# Patient Record
Sex: FEMALE | Race: WHITE | NOT HISPANIC OR LATINO | URBAN - METROPOLITAN AREA
[De-identification: names, ages, dates, MRNs, and addresses within clinical notes are randomized per-mention and may not be internally consistent; named-entity substitution may affect disease eponyms.]

---

## 2018-07-05 ENCOUNTER — TELEPHONE (OUTPATIENT)
Dept: FAMILY MEDICINE | Facility: CLINIC | Age: 53
End: 2018-07-05

## 2018-07-05 DIAGNOSIS — Z00.00 ROUTINE GENERAL MEDICAL EXAMINATION AT A HEALTH CARE FACILITY: ICD-10-CM

## 2018-07-05 DIAGNOSIS — J30.1 SEASONAL ALLERGIC RHINITIS DUE TO POLLEN, UNSPECIFIED CHRONICITY: ICD-10-CM

## 2018-07-05 DIAGNOSIS — Z11.59 NEED FOR HEPATITIS C SCREENING TEST: Primary | ICD-10-CM

## 2018-07-05 DIAGNOSIS — C43.60: ICD-10-CM

## 2018-07-05 DIAGNOSIS — K90.41 NON-CELIAC GLUTEN SENSITIVITY: ICD-10-CM

## 2018-07-05 NOTE — TELEPHONE ENCOUNTER
Pt would like a lab slip to have her fbw done before her physical on 9/5. She would also like a full thyroid work up. She states there is a lot of family issues with thyroid problems and she has been having a hard time losing weight and keeping weight off even though she is training for a marathon

## 2018-07-10 PROBLEM — K63.5 COLON POLYP: Status: ACTIVE | Noted: 2018-05-07

## 2018-07-10 RX ORDER — ALPRAZOLAM 0.25 MG/1
0.25 TABLET ORAL
COMMUNITY
Start: 2016-03-23

## 2018-07-11 NOTE — TELEPHONE ENCOUNTER
Let pt know labs were ordered    She can go to [facility] and have them done before her physical.  Please print slip and let her know can  Rs  as well to take with her

## 2018-07-24 ENCOUNTER — OFFICE VISIT (OUTPATIENT)
Dept: FAMILY MEDICINE | Facility: CLINIC | Age: 53
End: 2018-07-24
Payer: COMMERCIAL

## 2018-07-24 VITALS
BODY MASS INDEX: 27.53 KG/M2 | WEIGHT: 149.6 LBS | TEMPERATURE: 97.9 F | SYSTOLIC BLOOD PRESSURE: 110 MMHG | DIASTOLIC BLOOD PRESSURE: 80 MMHG | HEART RATE: 64 BPM | HEIGHT: 62 IN | RESPIRATION RATE: 14 BRPM

## 2018-07-24 DIAGNOSIS — B37.0 THRUSH, ORAL: Primary | ICD-10-CM

## 2018-07-24 PROCEDURE — 99213 OFFICE O/P EST LOW 20 MIN: CPT | Performed by: FAMILY MEDICINE

## 2018-07-24 RX ORDER — NYSTATIN 100000 [USP'U]/ML
5 SUSPENSION ORAL 4 TIMES DAILY
Qty: 200 ML | Refills: 0 | Status: SHIPPED | OUTPATIENT
Start: 2018-07-24 | End: 2019-09-17 | Stop reason: ALTCHOICE

## 2018-07-24 ASSESSMENT — ENCOUNTER SYMPTOMS
APPETITE CHANGE: 0
TROUBLE SWALLOWING: 0
SORE THROAT: 0
ACTIVITY CHANGE: 0
FEVER: 0
FATIGUE: 0
CHILLS: 0

## 2018-08-30 ENCOUNTER — TELEPHONE (OUTPATIENT)
Dept: FAMILY MEDICINE | Facility: CLINIC | Age: 53
End: 2018-08-30

## 2018-08-30 LAB
ALBUMIN SERPL-MCNC: 4 G/DL (ref 3.6–5.1)
ALBUMIN/GLOB SERPL: 1.8 (CALC) (ref 1–2.5)
ALP SERPL-CCNC: 57 U/L (ref 33–130)
ALT SERPL-CCNC: 20 U/L (ref 6–29)
APPEARANCE UR: CLEAR
AST SERPL-CCNC: 20 U/L (ref 10–35)
BACTERIA #/AREA URNS HPF: (no result) /HPF
BASOPHILS # BLD AUTO: 18 CELLS/UL (ref 0–200)
BASOPHILS NFR BLD AUTO: 0.3 %
BILIRUB SERPL-MCNC: 0.8 MG/DL (ref 0.2–1.2)
BILIRUB UR QL STRIP: NEGATIVE
BUN SERPL-MCNC: 13 MG/DL (ref 7–25)
BUN/CREAT SERPL: NORMAL (CALC) (ref 6–22)
CALCIUM SERPL-MCNC: 9.2 MG/DL (ref 8.6–10.4)
CHLORIDE SERPL-SCNC: 106 MMOL/L (ref 98–110)
CHOLEST SERPL-MCNC: 208 MG/DL
CHOLEST/HDLC SERPL: 2.4 (CALC)
CO2 SERPL-SCNC: 26 MMOL/L (ref 20–32)
COLOR UR: YELLOW
CREAT SERPL-MCNC: 0.97 MG/DL (ref 0.5–1.05)
EOSINOPHIL # BLD AUTO: 41 CELLS/UL (ref 15–500)
EOSINOPHIL NFR BLD AUTO: 0.7 %
ERYTHROCYTE [DISTWIDTH] IN BLOOD BY AUTOMATED COUNT: 12.3 % (ref 11–15)
GFR SERPL CREATININE-BSD FRML MDRD: 67 ML/MIN/1.73M2
GLOBULIN SER CALC-MCNC: 2.2 G/DL (CALC) (ref 1.9–3.7)
GLUCOSE SERPL-MCNC: 90 MG/DL (ref 65–99)
GLUCOSE UR QL STRIP: NEGATIVE
HCT VFR BLD AUTO: 40.9 % (ref 35–45)
HCV AB S/CO SERPL IA: 0.01
HCV AB SERPL QL IA: NORMAL
HDLC SERPL-MCNC: 85 MG/DL
HGB BLD-MCNC: 13.7 G/DL (ref 11.7–15.5)
HGB UR QL STRIP: NEGATIVE
HYALINE CASTS #/AREA URNS LPF: (no result) /LPF
KETONES UR QL STRIP: NEGATIVE
LDLC SERPL CALC-MCNC: 109 MG/DL (CALC)
LEUKOCYTE ESTERASE UR QL STRIP: (no result)
LYMPHOCYTES # BLD AUTO: 2260 CELLS/UL (ref 850–3900)
LYMPHOCYTES NFR BLD AUTO: 38.3 %
MCH RBC QN AUTO: 30.8 PG (ref 27–33)
MCHC RBC AUTO-ENTMCNC: 33.5 G/DL (ref 32–36)
MCV RBC AUTO: 91.9 FL (ref 80–100)
MONOCYTES # BLD AUTO: 460 CELLS/UL (ref 200–950)
MONOCYTES NFR BLD AUTO: 7.8 %
NEUTROPHILS # BLD AUTO: 3121 CELLS/UL (ref 1500–7800)
NEUTROPHILS NFR BLD AUTO: 52.9 %
NITRITE UR QL STRIP: NEGATIVE
NONHDLC SERPL-MCNC: 123 MG/DL (CALC)
PH UR STRIP: 7.5 [PH] (ref 5–8)
PLATELET # BLD AUTO: 188 THOUSAND/UL (ref 140–400)
PMV BLD REES-ECKER: 10.4 FL (ref 7.5–12.5)
POTASSIUM SERPL-SCNC: 3.9 MMOL/L (ref 3.5–5.3)
PROT SERPL-MCNC: 6.2 G/DL (ref 6.1–8.1)
PROT UR QL STRIP: NEGATIVE
RBC # BLD AUTO: 4.45 MILLION/UL (ref 3.8–5.1)
RBC #/AREA URNS HPF: (no result) /HPF
SODIUM SERPL-SCNC: 140 MMOL/L (ref 135–146)
SP GR UR STRIP: 1 (ref 1–1.03)
SQUAMOUS #/AREA URNS HPF: (no result) /HPF
T4 FREE SERPL-MCNC: 1.1 NG/DL (ref 0.8–1.8)
TRIGL SERPL-MCNC: 47 MG/DL
TSH SERPL-ACNC: 1.51 MIU/L
WBC # BLD AUTO: 5.9 THOUSAND/UL (ref 3.8–10.8)
WBC #/AREA URNS HPF: (no result) /HPF

## 2018-08-30 NOTE — TELEPHONE ENCOUNTER
lmom for patient       ----- Message from Myrna Norwood DO sent at 8/30/2018  9:18 AM EDT -----  Let pt know that the CBC, CMP, TSH, T4 are all ok.   The chol was ok at 208,  The LDL is ok at 109. The urinalysis is ok

## 2018-08-30 NOTE — TELEPHONE ENCOUNTER
Let pt know that the CBC, CMP, TSH, T4 are all ok.   The chol was ok at 208,  The LDL is ok at 109. The urinalysis is ok

## 2018-08-31 ENCOUNTER — TELEPHONE (OUTPATIENT)
Dept: FAMILY MEDICINE | Facility: CLINIC | Age: 53
End: 2018-08-31

## 2018-09-03 PROBLEM — Z00.00 ROUTINE GENERAL MEDICAL EXAMINATION AT A HEALTH CARE FACILITY: Status: ACTIVE | Noted: 2018-09-03

## 2018-09-05 ENCOUNTER — OFFICE VISIT (OUTPATIENT)
Dept: FAMILY MEDICINE | Facility: CLINIC | Age: 53
End: 2018-09-05
Payer: COMMERCIAL

## 2018-09-05 VITALS
HEART RATE: 68 BPM | DIASTOLIC BLOOD PRESSURE: 72 MMHG | TEMPERATURE: 98 F | SYSTOLIC BLOOD PRESSURE: 104 MMHG | WEIGHT: 148.8 LBS | HEIGHT: 62 IN | BODY MASS INDEX: 27.38 KG/M2 | RESPIRATION RATE: 16 BRPM

## 2018-09-05 DIAGNOSIS — J30.1 SEASONAL ALLERGIC RHINITIS DUE TO POLLEN, UNSPECIFIED CHRONICITY: ICD-10-CM

## 2018-09-05 DIAGNOSIS — Z00.00 ROUTINE GENERAL MEDICAL EXAMINATION AT A HEALTH CARE FACILITY: Primary | ICD-10-CM

## 2018-09-05 DIAGNOSIS — K63.5 POLYP OF COLON, UNSPECIFIED PART OF COLON, UNSPECIFIED TYPE: ICD-10-CM

## 2018-09-05 DIAGNOSIS — F41.9 ANXIETY: ICD-10-CM

## 2018-09-05 DIAGNOSIS — Z12.39 BREAST CANCER SCREENING: ICD-10-CM

## 2018-09-05 DIAGNOSIS — Z80.3 FAMILY HISTORY OF BREAST CANCER: ICD-10-CM

## 2018-09-05 DIAGNOSIS — C43.60: ICD-10-CM

## 2018-09-05 DIAGNOSIS — Z87.39 HX OF OSTEOPENIA: ICD-10-CM

## 2018-09-05 DIAGNOSIS — K90.41 NON-CELIAC GLUTEN SENSITIVITY: ICD-10-CM

## 2018-09-05 PROCEDURE — 99396 PREV VISIT EST AGE 40-64: CPT | Performed by: FAMILY MEDICINE

## 2018-09-05 RX ORDER — CHOLECALCIFEROL (VITAMIN D3) 25 MCG
1000 TABLET ORAL DAILY
COMMUNITY

## 2018-09-05 NOTE — ASSESSMENT & PLAN NOTE
routine labs reviewed with pt.   Chol 208 ,   Mammogram Rx given   Tdap up to date  To gyn Gyn for   Has seen Dr Andre in the past

## 2018-09-05 NOTE — PROGRESS NOTES
Subjective      Patient ID: Maureen Melo is a 53 y.o. female.    53 YEAR OLD here for her physical .  Has had a endometrial ablasion about 13 years ago for heavy periods.  This did make them lighter but did not take them fully away.  She stopped periods about 6  Years ago approximate menopause about age 47.  She still has some hot flashes.          Tobacco  Allergies  Meds  Problems  Med Hx  Surg Hx  Fam Hx       Review of Systems   Constitutional: Negative.    HENT: Negative.    Eyes: Negative.    Respiratory: Negative.    Cardiovascular: Negative.    Gastrointestinal: Negative.    Endocrine: Negative.    Genitourinary: Negative.    Musculoskeletal: Negative.    Skin: Negative.    Allergic/Immunologic: Negative.    Neurological: Negative.    Hematological: Negative.      Allergies   Allergen Reactions   • No Known Allergies      Current Outpatient Prescriptions   Medication Sig Dispense Refill   • ALPRAZolam (XANAX) 0.25 mg tablet 0.25 mg.     • cholecalciferol, vitamin D3, 1,000 unit tablet Take 1,000 Units by mouth daily.       No current facility-administered medications for this visit.      Past Medical History:   Diagnosis Date   • Anxiety    • Colon polyp 2018    tubular adenoma   • Family history of breast cancer    • Malignant melanoma of upper arm (CMS/HCC) (HCC) 3/23/2016    Overview:    • Non-celiac gluten sensitivity 2016    Overview:    • Seasonal allergies 3/23/2016    Overview:      Past Surgical History:   Procedure Laterality Date   •  SECTION     • COLONOSCOPY     • DIAGNOSTIC LAPAROSCOPY     • ENDOMETRIAL ABLATION     • SKIN CANCER EXCISION      melanoma   • TONSILLECTOMY     • TUBAL LIGATION       Social History   Substance Use Topics   • Smoking status: Never Smoker   • Smokeless tobacco: Never Used   • Alcohol use Not on file     Family History   Problem Relation Age of Onset   • Anxiety disorder Mother    • Heart disease Mother    • Hyperlipidemia Mother    •  "Thyroid disease Mother    • Prostate cancer Father    • Glaucoma Father    • Breast cancer Sister    • Anxiety disorder Sister    • Thyroid disease Sister        Objective   Vitals:    18 0751   BP: 104/72   Pulse: 68   Resp: 16   Temp: 36.7 °C (98 °F)   Weight: 67.5 kg (148 lb 12.8 oz)   Height: 1.575 m (5' 2\")    Body mass index is 27.22 kg/m².  Physical Exam   Constitutional: She is oriented to person, place, and time. She appears well-developed and well-nourished.   Eyes: Conjunctivae and EOM are normal. Pupils are equal, round, and reactive to light.   Cardiovascular: Normal rate, regular rhythm, normal heart sounds and intact distal pulses.    Pulmonary/Chest: Breath sounds normal. No respiratory distress.   Abdominal: Bowel sounds are normal.   Musculoskeletal: Normal range of motion.   Neurological: She is alert and oriented to person, place, and time.   Skin: Skin is warm.   Psychiatric: She has a normal mood and affect.       Assessment/Plan   Problem List Items Addressed This Visit     Malignant melanoma of upper arm (CMS/HCC) (HCC)     Follows with derm         Non-celiac gluten sensitivity     Follows gluten free diet          Seasonal allergies     Prn meds         Colon polyp     Has had a colon polyp  Last colonoscopy 2018         Routine general medical examination at a health care facility - Primary      routine labs reviewed with pt.   Chol 208 ,   Mammogram Rx given   Tdap up to date  To gyn Gyn for   Has seen Dr Valle in the past           Relevant Orders    DEXA BONE DENSITY    Hx of osteopenia     Continue Vit D 3 1000 Iu  Check Dexa scan. rx given            Relevant Orders    DEXA BONE DENSITY    Anxiety     Prn alprazolam          Family history of breast cancer     Sister  from breast cancer   Mammogram rx given. Make appt with Dr Valle           Other Visit Diagnoses     Breast cancer screening        Relevant Orders    BI SCREENING MAMMOGRAM BILATERAL    "       Patient agrees and verbalizes understanding of medication and treatment plan discussed at today's visit.  Patient was instructed to call or follow-up if symptoms persist or worsen.    No notes on file    Myrna Norwood, DO    This note was created with voice recognition software. Inadvertent dictation errors should be disregarded. Please contact my office for clarification.

## 2018-09-08 PROBLEM — F41.9 ANXIETY: Status: ACTIVE | Noted: 2018-09-08

## 2018-09-08 ASSESSMENT — ENCOUNTER SYMPTOMS
ENDOCRINE NEGATIVE: 1
GASTROINTESTINAL NEGATIVE: 1
ALLERGIC/IMMUNOLOGIC NEGATIVE: 1
CONSTITUTIONAL NEGATIVE: 1
EYES NEGATIVE: 1
CARDIOVASCULAR NEGATIVE: 1
HEMATOLOGIC/LYMPHATIC NEGATIVE: 1
MUSCULOSKELETAL NEGATIVE: 1
RESPIRATORY NEGATIVE: 1
NEUROLOGICAL NEGATIVE: 1

## 2018-09-09 PROBLEM — Z80.3 FAMILY HISTORY OF BREAST CANCER: Status: ACTIVE | Noted: 2018-09-09

## 2018-09-12 ENCOUNTER — TELEPHONE (OUTPATIENT)
Dept: FAMILY MEDICINE | Facility: CLINIC | Age: 53
End: 2018-09-12

## 2018-09-12 NOTE — TELEPHONE ENCOUNTER
Faxed request over  ----- Message from Myrna Norwood DO sent at 9/9/2018 10:06 AM EDT -----  Please get pap and mammogram sent on this pt fro Dr Andre office , listed

## 2019-02-11 ENCOUNTER — TELEPHONE (OUTPATIENT)
Dept: FAMILY MEDICINE | Facility: CLINIC | Age: 54
End: 2019-02-11

## 2019-02-11 NOTE — TELEPHONE ENCOUNTER
Patient called stating she faxed a biometric form last week. She wants to confirm if we received it and if so when will it be completed. Please return her call. [phone].

## 2019-02-13 NOTE — TELEPHONE ENCOUNTER
Pt called about her biometric form, she is faxing another one over just in case you don't have it. She needs it asap

## 2019-04-03 ENCOUNTER — OFFICE VISIT (OUTPATIENT)
Dept: FAMILY MEDICINE | Facility: CLINIC | Age: 54
End: 2019-04-03
Payer: COMMERCIAL

## 2019-04-03 ENCOUNTER — HOSPITAL ENCOUNTER (OUTPATIENT)
Dept: RADIOLOGY | Age: 54
Discharge: HOME | End: 2019-04-03
Attending: FAMILY MEDICINE
Payer: COMMERCIAL

## 2019-04-03 VITALS
BODY MASS INDEX: 28.37 KG/M2 | WEIGHT: 154.2 LBS | TEMPERATURE: 97.9 F | HEIGHT: 62 IN | RESPIRATION RATE: 18 BRPM | HEART RATE: 68 BPM | SYSTOLIC BLOOD PRESSURE: 112 MMHG | DIASTOLIC BLOOD PRESSURE: 80 MMHG

## 2019-04-03 DIAGNOSIS — G89.29 CHRONIC RIGHT-SIDED LOW BACK PAIN WITH RIGHT-SIDED SCIATICA: Primary | ICD-10-CM

## 2019-04-03 DIAGNOSIS — G89.29 CHRONIC RIGHT-SIDED LOW BACK PAIN WITH RIGHT-SIDED SCIATICA: ICD-10-CM

## 2019-04-03 DIAGNOSIS — R82.998 LEUKOCYTES IN URINE: ICD-10-CM

## 2019-04-03 DIAGNOSIS — M54.41 CHRONIC RIGHT-SIDED LOW BACK PAIN WITH RIGHT-SIDED SCIATICA: Primary | ICD-10-CM

## 2019-04-03 DIAGNOSIS — M54.41 CHRONIC RIGHT-SIDED LOW BACK PAIN WITH RIGHT-SIDED SCIATICA: ICD-10-CM

## 2019-04-03 LAB
BILIRUBIN, POC: NEGATIVE
BLOOD URINE, POC: NEGATIVE
CLARITY, POC: CLEAR
COLOR, POC: YELLOW
EXPIRATION DATE: ABNORMAL
GLUCOSE URINE, POC: NEGATIVE
KETONES, POC: NEGATIVE
LEUKOCYTE EST, POC: ABNORMAL
Lab: ABNORMAL
NITRITE, POC: NEGATIVE
PH, POC: 5
PROTEIN, POC: NEGATIVE
SPECIFIC GRAVITY, POC: 1
UROBILINOGEN, POC: >=8

## 2019-04-03 PROCEDURE — 99213 OFFICE O/P EST LOW 20 MIN: CPT | Mod: 25 | Performed by: FAMILY MEDICINE

## 2019-04-03 PROCEDURE — 81002 URINALYSIS NONAUTO W/O SCOPE: CPT | Performed by: FAMILY MEDICINE

## 2019-04-03 PROCEDURE — 72110 X-RAY EXAM L-2 SPINE 4/>VWS: CPT

## 2019-04-03 RX ORDER — MELOXICAM 7.5 MG/1
7.5 TABLET ORAL DAILY
Qty: 30 TABLET | Refills: 0 | Status: SHIPPED | OUTPATIENT
Start: 2019-04-03 | End: 2019-04-30 | Stop reason: SDUPTHER

## 2019-04-03 NOTE — PROGRESS NOTES
Subjective      Patient ID: Maureen Melo is a 53 y.o. female.    Pt c/o back pain and pain down her leg.  She was in FLA/     This pain came on about 3 weeks ago        Tobacco  Med Hx  Surg Hx  Fam Hx       Review of Systems   Constitutional: Negative.    Respiratory: Negative.    Cardiovascular: Negative.    Musculoskeletal: Positive for back pain.   Skin: Negative.    Neurological: Negative.      Allergies   Allergen Reactions   • No Known Allergies      Current Outpatient Prescriptions   Medication Sig Dispense Refill   • cholecalciferol, vitamin D3, 1,000 unit tablet Take 1,000 Units by mouth daily.     • ALPRAZolam (XANAX) 0.25 mg tablet 0.25 mg.     • meloxicam (MOBIC) 7.5 mg tablet Take 1 tablet (7.5 mg total) by mouth daily. 30 tablet 0     No current facility-administered medications for this visit.      Past Medical History:   Diagnosis Date   • Anxiety    • Colon polyp 2018    tubular adenoma   • Family history of breast cancer    • Malignant melanoma of upper arm (CMS/HCC) (HCC) 3/23/2016    Overview:    • Non-celiac gluten sensitivity 2016    Overview:    • Osteopenia   study   • Seasonal allergies 3/23/2016    Overview:      Past Surgical History:   Procedure Laterality Date   •  SECTION     • COLONOSCOPY     • DIAGNOSTIC LAPAROSCOPY     • ENDOMETRIAL ABLATION     • SKIN CANCER EXCISION      melanoma   • TONSILLECTOMY     • TUBAL LIGATION       Social History   Substance Use Topics   • Smoking status: Never Smoker   • Smokeless tobacco: Never Used   • Alcohol use Not on file     Family History   Problem Relation Age of Onset   • Anxiety disorder Mother    • Heart disease Mother    • Hyperlipidemia Mother    • Thyroid disease Mother    • Prostate cancer Father    • Glaucoma Father    • Breast cancer Sister    • Anxiety disorder Sister    • Thyroid disease Sister        Objective   Vitals:    19 0911   BP: 112/80   Pulse: 68   Resp: 18   Temp: 36.6 °C (97.9 °F)  "  Weight: 69.9 kg (154 lb 3.2 oz)   Height: 1.575 m (5' 2\")    Body mass index is 28.2 kg/m².  Physical Exam   Constitutional: She appears well-developed and well-nourished.   Neck: Normal range of motion. Neck supple.   Cardiovascular: Normal rate.    Pulmonary/Chest: Effort normal.   Musculoskeletal: She exhibits tenderness.   Low back SI joint area       Assessment/Plan   Problem List Items Addressed This Visit     Chronic right-sided low back pain with right-sided sciatica - Primary     Referral to PT  Also NSAID, heat and ice   Xray            Relevant Medications    meloxicam (MOBIC) 7.5 mg tablet    Other Relevant Orders    X-RAY LUMBAR SPINE COMPLETE 4+ VIEWS (Completed)    POCT urinalysis dipstick (Completed)    Ambulatory referral to Physical Therapy    Leukocytes in urine     Urine cx done         Relevant Orders    Urine culture (Completed)          Patient agrees and verbalizes understanding of medication and treatment plan discussed at today's visit.  Patient was instructed to call or follow-up if symptoms persist or worsen.    No notes on file    Myrna Norwood,       This note was created with voice recognition software. Inadvertent dictation errors should be disregarded. Please contact my office for clarification.  "

## 2019-04-04 ENCOUNTER — TELEPHONE (OUTPATIENT)
Dept: FAMILY MEDICINE | Facility: CLINIC | Age: 54
End: 2019-04-04

## 2019-04-04 PROBLEM — M54.41 CHRONIC RIGHT-SIDED LOW BACK PAIN WITH RIGHT-SIDED SCIATICA: Status: ACTIVE | Noted: 2019-04-04

## 2019-04-04 PROBLEM — G89.29 CHRONIC RIGHT-SIDED LOW BACK PAIN WITH RIGHT-SIDED SCIATICA: Status: ACTIVE | Noted: 2019-04-04

## 2019-04-04 NOTE — TELEPHONE ENCOUNTER
Patient and wanted you to know that they are going to make appointment with [facility] in [place]. Please call with questions [phone]

## 2019-04-04 NOTE — TELEPHONE ENCOUNTER
Pt called again. Could you possibly do PT script today so she can pick it up. Give when it's ready.

## 2019-04-05 LAB — BACTERIA UR CULT: NORMAL

## 2019-04-07 PROBLEM — R82.998 LEUKOCYTES IN URINE: Status: ACTIVE | Noted: 2019-04-07

## 2019-04-07 ASSESSMENT — ENCOUNTER SYMPTOMS
CONSTITUTIONAL NEGATIVE: 1
NEUROLOGICAL NEGATIVE: 1
BACK PAIN: 1
RESPIRATORY NEGATIVE: 1
CARDIOVASCULAR NEGATIVE: 1

## 2019-04-30 DIAGNOSIS — G89.29 CHRONIC RIGHT-SIDED LOW BACK PAIN WITH RIGHT-SIDED SCIATICA: ICD-10-CM

## 2019-04-30 DIAGNOSIS — M54.41 CHRONIC RIGHT-SIDED LOW BACK PAIN WITH RIGHT-SIDED SCIATICA: ICD-10-CM

## 2019-04-30 RX ORDER — MELOXICAM 7.5 MG/1
TABLET ORAL
Qty: 30 TABLET | Refills: 0 | Status: SHIPPED | OUTPATIENT
Start: 2019-04-30 | End: 2019-09-17 | Stop reason: ALTCHOICE

## 2019-08-06 ENCOUNTER — TELEPHONE (OUTPATIENT)
Dept: FAMILY MEDICINE | Facility: CLINIC | Age: 54
End: 2019-08-06

## 2019-08-06 DIAGNOSIS — Z80.3 FAMILY HISTORY OF BREAST CANCER: ICD-10-CM

## 2019-08-06 DIAGNOSIS — Z00.00 ROUTINE GENERAL MEDICAL EXAMINATION AT A HEALTH CARE FACILITY: ICD-10-CM

## 2019-08-06 DIAGNOSIS — Z87.39 HX OF OSTEOPENIA: Primary | ICD-10-CM

## 2019-08-06 DIAGNOSIS — K90.41 NON-CELIAC GLUTEN SENSITIVITY: ICD-10-CM

## 2019-08-06 NOTE — TELEPHONE ENCOUNTER
Patient has a physical in September and would like a fbw ([facility]). Call her when it is ready

## 2019-09-04 LAB
25(OH)D3 SERPL-MCNC: 58 NG/ML (ref 30–100)
ALBUMIN SERPL-MCNC: 4.1 G/DL (ref 3.6–5.1)
ALBUMIN/GLOB SERPL: 2 (CALC) (ref 1–2.5)
ALP SERPL-CCNC: 51 U/L (ref 33–130)
ALT SERPL-CCNC: 20 U/L (ref 6–29)
APPEARANCE UR: CLEAR
AST SERPL-CCNC: 18 U/L (ref 10–35)
BACTERIA #/AREA URNS HPF: (no result) /HPF
BASOPHILS # BLD AUTO: 28 CELLS/UL (ref 0–200)
BASOPHILS NFR BLD AUTO: 0.7 %
BILIRUB SERPL-MCNC: 0.9 MG/DL (ref 0.2–1.2)
BILIRUB UR QL STRIP: NEGATIVE
BUN SERPL-MCNC: 20 MG/DL (ref 7–25)
BUN/CREAT SERPL: NORMAL (CALC) (ref 6–22)
CALCIUM SERPL-MCNC: 9.2 MG/DL (ref 8.6–10.4)
CHLORIDE SERPL-SCNC: 105 MMOL/L (ref 98–110)
CHOLEST SERPL-MCNC: 194 MG/DL
CHOLEST/HDLC SERPL: 2.9 (CALC)
CO2 SERPL-SCNC: 26 MMOL/L (ref 20–32)
COLOR UR: YELLOW
CREAT SERPL-MCNC: 1.05 MG/DL (ref 0.5–1.05)
EOSINOPHIL # BLD AUTO: 68 CELLS/UL (ref 15–500)
EOSINOPHIL NFR BLD AUTO: 1.7 %
ERYTHROCYTE [DISTWIDTH] IN BLOOD BY AUTOMATED COUNT: 12.5 % (ref 11–15)
GLOBULIN SER CALC-MCNC: 2.1 G/DL (CALC) (ref 1.9–3.7)
GLUCOSE SERPL-MCNC: 92 MG/DL (ref 65–99)
GLUCOSE UR QL STRIP: NEGATIVE
HCT VFR BLD AUTO: 44.9 % (ref 35–45)
HDLC SERPL-MCNC: 66 MG/DL
HGB BLD-MCNC: 14.5 G/DL (ref 11.7–15.5)
HGB UR QL STRIP: NEGATIVE
HYALINE CASTS #/AREA URNS LPF: (no result) /LPF
KETONES UR QL STRIP: NEGATIVE
LDLC SERPL CALC-MCNC: 113 MG/DL (CALC)
LEUKOCYTE ESTERASE UR QL STRIP: (no result)
LYMPHOCYTES # BLD AUTO: 2140 CELLS/UL (ref 850–3900)
LYMPHOCYTES NFR BLD AUTO: 53.5 %
MCH RBC QN AUTO: 29.5 PG (ref 27–33)
MCHC RBC AUTO-ENTMCNC: 32.3 G/DL (ref 32–36)
MCV RBC AUTO: 91.3 FL (ref 80–100)
MONOCYTES # BLD AUTO: 328 CELLS/UL (ref 200–950)
MONOCYTES NFR BLD AUTO: 8.2 %
NEUTROPHILS # BLD AUTO: 1436 CELLS/UL (ref 1500–7800)
NEUTROPHILS NFR BLD AUTO: 35.9 %
NITRITE UR QL STRIP: NEGATIVE
NONHDLC SERPL-MCNC: 128 MG/DL (CALC)
PH UR STRIP: 6.5 [PH] (ref 5–8)
PLATELET # BLD AUTO: 189 THOUSAND/UL (ref 140–400)
PMV BLD REES-ECKER: 10.9 FL (ref 7.5–12.5)
POTASSIUM SERPL-SCNC: 3.9 MMOL/L (ref 3.5–5.3)
PROT SERPL-MCNC: 6.2 G/DL (ref 6.1–8.1)
PROT UR QL STRIP: NEGATIVE
QUEST EGFR NON-AFR. AMERICAN: 60 ML/MIN/1.73M2
RBC # BLD AUTO: 4.92 MILLION/UL (ref 3.8–5.1)
RBC #/AREA URNS HPF: (no result) /HPF
SODIUM SERPL-SCNC: 139 MMOL/L (ref 135–146)
SP GR UR STRIP: 1 (ref 1–1.03)
SQUAMOUS #/AREA URNS HPF: (no result) /HPF
TRIGL SERPL-MCNC: 64 MG/DL
TSH SERPL-ACNC: 1.5 MIU/L
WBC # BLD AUTO: 4 THOUSAND/UL (ref 3.8–10.8)
WBC #/AREA URNS HPF: (no result) /HPF

## 2019-09-17 ENCOUNTER — OFFICE VISIT (OUTPATIENT)
Dept: FAMILY MEDICINE | Facility: CLINIC | Age: 54
End: 2019-09-17
Payer: COMMERCIAL

## 2019-09-17 VITALS
BODY MASS INDEX: 26.68 KG/M2 | TEMPERATURE: 98.2 F | DIASTOLIC BLOOD PRESSURE: 78 MMHG | HEIGHT: 62 IN | RESPIRATION RATE: 13 BRPM | WEIGHT: 145 LBS | SYSTOLIC BLOOD PRESSURE: 114 MMHG | HEART RATE: 68 BPM

## 2019-09-17 DIAGNOSIS — C43.60: ICD-10-CM

## 2019-09-17 DIAGNOSIS — J30.2 SEASONAL ALLERGIES: ICD-10-CM

## 2019-09-17 DIAGNOSIS — F41.9 ANXIETY: ICD-10-CM

## 2019-09-17 DIAGNOSIS — K63.5 POLYP OF COLON, UNSPECIFIED PART OF COLON, UNSPECIFIED TYPE: ICD-10-CM

## 2019-09-17 DIAGNOSIS — Z87.39 HX OF OSTEOPENIA: ICD-10-CM

## 2019-09-17 DIAGNOSIS — K90.41 NON-CELIAC GLUTEN SENSITIVITY: ICD-10-CM

## 2019-09-17 DIAGNOSIS — Z00.00 ROUTINE GENERAL MEDICAL EXAMINATION AT A HEALTH CARE FACILITY: Primary | ICD-10-CM

## 2019-09-17 DIAGNOSIS — Z12.31 SCREENING MAMMOGRAM, ENCOUNTER FOR: ICD-10-CM

## 2019-09-17 DIAGNOSIS — Z80.3 FAMILY HISTORY OF BREAST CANCER: ICD-10-CM

## 2019-09-17 PROCEDURE — 99396 PREV VISIT EST AGE 40-64: CPT | Performed by: FAMILY MEDICINE

## 2019-09-17 RX ORDER — HYDROCHLOROTHIAZIDE 25 MG/1
TABLET ORAL
COMMUNITY
Start: 1999-11-09 | End: 2019-09-17 | Stop reason: ALTCHOICE

## 2019-09-17 ASSESSMENT — ENCOUNTER SYMPTOMS
HEMATOLOGIC/LYMPHATIC NEGATIVE: 1
CARDIOVASCULAR NEGATIVE: 1
NUMBNESS: 1
RESPIRATORY NEGATIVE: 1
ENDOCRINE NEGATIVE: 1
ALLERGIC/IMMUNOLOGIC NEGATIVE: 1
EYES NEGATIVE: 1
CONSTITUTIONAL NEGATIVE: 1
MUSCULOSKELETAL NEGATIVE: 1
PSYCHIATRIC NEGATIVE: 1
GASTROINTESTINAL NEGATIVE: 1

## 2019-09-17 NOTE — ASSESSMENT & PLAN NOTE
Prescription given for mammogram today.  Make appointment with Dr. Andre  Breast cancer in sister (  of breast cancer)

## 2019-09-17 NOTE — ASSESSMENT & PLAN NOTE
Labs reviewed and good  Mammogram prescription copy will go to GYN Dr. Andre  Colonoscopy up-to-date 2018 due in 5 years  See GYN for yearly pelvic/Pap  Tdap up-to-date   Does not want a flu shot today    Does not want to have dexa presently

## 2019-09-17 NOTE — ASSESSMENT & PLAN NOTE
Continue vitamin D  DEXA scan 2014 scanned with osteopenia  Pt has been menopausal since about age 46  Does not want dexa scan but will consider it

## 2019-09-17 NOTE — PROGRESS NOTES
Subjective      Patient ID: Maureen Melo is a 54 y.o. female.    Patient is here for her physical. She denies specific complaints except for left hand tingling in certain activities like driving a car or riding her bike.  She denies other issues at present time, exercises regularly without difficulty.        Tobacco  Allergies  Meds  Problems  Med Hx  Surg Hx  Fam Hx       Review of Systems   Constitutional: Negative.    HENT: Negative.    Eyes: Negative.    Respiratory: Negative.    Cardiovascular: Negative.    Gastrointestinal: Negative.    Endocrine: Negative.    Genitourinary: Negative.    Musculoskeletal: Negative.    Allergic/Immunologic: Negative.    Neurological: Positive for numbness (left hand - as per HPI).   Hematological: Negative.    Psychiatric/Behavioral: Negative.      Allergies   Allergen Reactions   • No Known Allergies      Current Outpatient Prescriptions   Medication Sig Dispense Refill   • ALPRAZolam (XANAX) 0.25 mg tablet 0.25 mg.     • cholecalciferol, vitamin D3, 1,000 unit tablet Take 1,000 Units by mouth daily.       No current facility-administered medications for this visit.      Past Medical History:   Diagnosis Date   • Anxiety    • Colon polyp 2018    tubular adenoma   • Family history of breast cancer    • Malignant melanoma of upper arm (CMS/HCC) (HCC) 3/23/2016    Overview:    • Non-celiac gluten sensitivity 2016    Overview:    • Osteopenia   study   • Seasonal allergies 3/23/2016    Overview:      Past Surgical History:   Procedure Laterality Date   •  SECTION     • COLONOSCOPY     • DIAGNOSTIC LAPAROSCOPY     • ENDOMETRIAL ABLATION     • SKIN CANCER EXCISION      melanoma   • TONSILLECTOMY     • TUBAL LIGATION       Social History   Substance Use Topics   • Smoking status: Never Smoker   • Smokeless tobacco: Never Used   • Alcohol use Not on file     Family History   Problem Relation Age of Onset   • Anxiety disorder Biological Mother    • Heart  "disease Biological Mother    • Hyperlipidemia Biological Mother    • Thyroid disease Biological Mother    • Prostate cancer Biological Father    • Glaucoma Biological Father    • Breast cancer Sister    • Anxiety disorder Sister    • Thyroid disease Sister        Objective   Vitals:    09/17/19 0747   BP: 114/78   Pulse: 68   Resp: 13   Temp: 36.8 °C (98.2 °F)   Weight: 65.8 kg (145 lb)   Height: 1.575 m (5' 2\")    Body mass index is 26.52 kg/m².  Physical Exam   Constitutional: She is oriented to person, place, and time. She appears well-developed and well-nourished.   HENT:   Head: Normocephalic and atraumatic.   Mouth/Throat: Oropharynx is clear and moist.   Eyes: Pupils are equal, round, and reactive to light. EOM are normal.   Neck: Normal range of motion. Neck supple. No thyromegaly present.   Cardiovascular: Normal rate, regular rhythm and normal heart sounds.    No murmur heard.  Pulmonary/Chest: Effort normal and breath sounds normal.   Abdominal: Soft. Bowel sounds are normal. She exhibits no mass. There is no tenderness. There is no guarding.   Musculoskeletal: Normal range of motion.   Lymphadenopathy:     She has no cervical adenopathy.   Neurological: She is alert and oriented to person, place, and time.   + Tinel and + Phalen left   Skin: Skin is warm and dry.   Sun damage .   Psychiatric: She has a normal mood and affect.   Vitals reviewed.      Assessment/Plan   Problem List Items Addressed This Visit     Malignant melanoma of upper arm (CMS/HCC) (HCC)     Follows with dermatology         Non-celiac gluten sensitivity     Patient follows gluten-free diet.  stable         Seasonal allergies     PRN meds         Colon polyp     Last colonoscopy was in 2018.  Due in 5 years         Routine general medical examination at a health care facility - Primary     Labs reviewed and good  Mammogram prescription copy will go to GYN Dr. Andre  Colonoscopy up-to-date 2018 due in 5 years  See GYN for yearly " pelvic/Pap  Tdap up-to-date   Does not want a flu shot today    Does not want to have dexa presently          Hx of osteopenia     Continue vitamin D  DEXA scan  scanned with osteopenia  Pt has been menopausal since about age 46  Does not want dexa scan but will consider it          Anxiety     PRN alprazolam.  Does not use very often.  Exercise helps this as well and she is needing less         Family history of breast cancer     Prescription given for mammogram today.  Make appointment with Dr. Andre  Breast cancer in sister (  of breast cancer)          Screening mammogram, encounter for     Prescription given the patient         Relevant Orders    BI SCREENING MAMMOGRAM BILATERAL          Patient agrees and verbalizes understanding of medication and treatment plan discussed at today's visit.  Patient was instructed to call or follow-up if symptoms persist or worsen.    No notes on file    Myrna Norwood,       This note was created with voice recognition software. Inadvertent dictation errors should be disregarded. Please contact my office for clarification.

## 2020-01-02 ENCOUNTER — TELEPHONE (OUTPATIENT)
Dept: FAMILY MEDICINE | Facility: CLINIC | Age: 55
End: 2020-01-02

## 2020-01-02 RX ORDER — CYCLOBENZAPRINE HCL 10 MG
10 TABLET ORAL EVERY 8 HOURS PRN
Qty: 20 TABLET | Refills: 0 | Status: SHIPPED | OUTPATIENT
Start: 2020-01-02 | End: 2020-01-16

## 2020-01-02 NOTE — TELEPHONE ENCOUNTER
Pt has been having back spasms. Pt is scheduled for a massage and for a chiropractic appointment, wants to know if she can have a script for a muscle relaxer in the meantime

## 2020-01-06 ENCOUNTER — OFFICE VISIT (OUTPATIENT)
Dept: FAMILY MEDICINE | Facility: CLINIC | Age: 55
End: 2020-01-06
Payer: COMMERCIAL

## 2020-01-06 VITALS
WEIGHT: 144 LBS | SYSTOLIC BLOOD PRESSURE: 104 MMHG | DIASTOLIC BLOOD PRESSURE: 70 MMHG | BODY MASS INDEX: 26.5 KG/M2 | RESPIRATION RATE: 16 BRPM | HEART RATE: 80 BPM | HEIGHT: 62 IN

## 2020-01-06 DIAGNOSIS — G89.29 CHRONIC RIGHT-SIDED LOW BACK PAIN WITH RIGHT-SIDED SCIATICA: Primary | ICD-10-CM

## 2020-01-06 DIAGNOSIS — M54.41 CHRONIC RIGHT-SIDED LOW BACK PAIN WITH RIGHT-SIDED SCIATICA: Primary | ICD-10-CM

## 2020-01-06 DIAGNOSIS — C43.60: ICD-10-CM

## 2020-01-06 DIAGNOSIS — M25.551 RIGHT HIP PAIN: ICD-10-CM

## 2020-01-06 PROCEDURE — 99213 OFFICE O/P EST LOW 20 MIN: CPT | Performed by: FAMILY MEDICINE

## 2020-01-06 ASSESSMENT — ENCOUNTER SYMPTOMS
DYSURIA: 0
BRUISES/BLEEDS EASILY: 1
BACK PAIN: 1
CARDIOVASCULAR NEGATIVE: 1
NUMBNESS: 0
ADENOPATHY: 1
CONSTITUTIONAL NEGATIVE: 1
FREQUENCY: 0
WEAKNESS: 0
RESPIRATORY NEGATIVE: 1

## 2020-01-06 NOTE — ASSESSMENT & PLAN NOTE
X-ray in April degenerative disease with some mild spondylolysis    Patient would like MRI.  Patient currently with some right-sided SI joint pain some radiation to the hip.  Mild joint pain in the hip with external rotation    Patient saw the chiropractor.  They suggested she may have some problem with L2-L3.  Do not really see any straight leg raise positivity.  She does have pain that radiates to the front    MRI Ordered and suggested she start meloxicam 7.5 mg daily along with cyclobenzaprine at bedtime ice and heat and will try to get pre-CERT on the MRI

## 2020-01-06 NOTE — PROGRESS NOTES
Subjective      Patient ID: Maureen Melo is a 54 y.o. female.    Pt was sick 2 weeks ago.  She was in bed for 3 days.  Head cold and fever,  Left with back pain from laying around per pt.  Now can't lay or stand or walk etc . Pain is in the right back and thru to the hip and down the front of the leg.  Saw patient in April for right-sided back pain as well with some radiation down the back of the leg.  She had PT at that point.  She had an x-ray which showed some degenerative arthritis with mild spondylolysis.  She did get better in between and then the above history was provided      Tobacco  Allergies  Meds  Problems  Med Hx  Surg Hx  Fam Hx       Review of Systems   Constitutional: Negative.    HENT: Negative.    Respiratory: Negative.    Cardiovascular: Negative.    Genitourinary: Negative for dysuria, frequency and urgency.   Musculoskeletal: Positive for back pain.        Negative straight leg raise bilaterally.  Tender right SI joint.  Hip joint mostly normal exam.  Mild tenderness with external rotation   Neurological: Negative for weakness and numbness.        Maybe feels little weaker on that side    Hematological: Positive for adenopathy. Bruises/bleeds easily.     Allergies   Allergen Reactions   • No Known Allergies      Current Outpatient Medications   Medication Sig Dispense Refill   • cholecalciferol, vitamin D3, 1,000 unit tablet Take 1,000 Units by mouth daily.     • cyclobenzaprine (FLEXERIL) 10 mg tablet Take 1 tablet (10 mg total) by mouth every 8 (eight) hours as needed for muscle spasms for up to 14 days. 20 tablet 0   • ALPRAZolam (XANAX) 0.25 mg tablet 0.25 mg.       No current facility-administered medications for this visit.      Past Medical History:   Diagnosis Date   • Anxiety    • Colon polyp 05/07/2018    tubular adenoma   • Family history of breast cancer    • Malignant melanoma of upper arm (CMS/HCC) 3/23/2016    Overview:    • Non-celiac gluten sensitivity 8/4/2016     "Overview:    • Osteopenia 2014  study   • Seasonal allergies 3/23/2016    Overview:      Past Surgical History:   Procedure Laterality Date   •  SECTION     • COLONOSCOPY     • DIAGNOSTIC LAPAROSCOPY     • ENDOMETRIAL ABLATION     • SKIN CANCER EXCISION      melanoma   • TONSILLECTOMY     • TUBAL LIGATION       Social History     Tobacco Use   • Smoking status: Never Smoker   • Smokeless tobacco: Never Used   Substance Use Topics   • Alcohol use: Not on file   • Drug use: Not on file     Family History   Problem Relation Age of Onset   • Anxiety disorder Biological Mother    • Heart disease Biological Mother    • Hyperlipidemia Biological Mother    • Thyroid disease Biological Mother    • Prostate cancer Biological Father    • Glaucoma Biological Father    • Breast cancer Biological Sister    • Anxiety disorder Biological Sister    • Thyroid disease Biological Sister        Objective   Vitals:    20 1137   BP: 104/70   Pulse: 80   Resp: 16   Weight: 65.3 kg (144 lb)   Height: 1.575 m (5' 2\")    Body mass index is 26.34 kg/m².  Physical Exam   Constitutional: She is oriented to person, place, and time. She appears well-developed and well-nourished.   HENT:   Mouth/Throat: Oropharynx is clear and moist.   Neck: Normal range of motion. Neck supple.   Cardiovascular: Normal rate and regular rhythm.   Pulmonary/Chest: Effort normal and breath sounds normal.   Abdominal: Soft. She exhibits no distension.   Musculoskeletal: Normal range of motion. She exhibits tenderness (right SI joint ).   Neurological: She is alert and oriented to person, place, and time.   Skin: Skin is warm and dry.   Vitals reviewed.      Assessment/Plan   Problem List Items Addressed This Visit        Nervous    Chronic right-sided low back pain with right-sided sciatica - Primary     X-ray in April degenerative disease with some mild spondylolysis    Patient would like MRI.  Patient currently with some right-sided SI joint pain some " radiation to the hip.  Mild joint pain in the hip with external rotation    Patient saw the chiropractor.  They suggested she may have some problem with L2-L3.  Do not really see any straight leg raise positivity.  She does have pain that radiates to the front    MRI Ordered and suggested she start meloxicam 7.5 mg daily along with cyclobenzaprine at bedtime ice and heat and will try to get pre-CERT on the MRI         Relevant Orders    X-RAY HIP WITH OR WITHOUT PELVIS 4+ VW RIGHT    X-RAY LUMBAR SPINE COMPLETE 4+ VIEWS    MRI LUMBAR SPINE WITHOUT CONTRAST    Right hip pain    Relevant Orders    X-RAY HIP WITH OR WITHOUT PELVIS 4+ VW RIGHT    X-RAY LUMBAR SPINE COMPLETE 4+ VIEWS    MRI LUMBAR SPINE WITHOUT CONTRAST       Musculoskeletal    Malignant melanoma of upper arm (CMS/HCC)          Patient agrees and verbalizes understanding of medication and treatment plan discussed at today's visit.  Patient was instructed to call or follow-up if symptoms persist or worsen.    No notes on file    Myrna Norwood, DO      This note was created with voice recognition software. Inadvertent dictation errors should be disregarded. Please contact my office for clarification.

## 2020-01-09 ENCOUNTER — TELEPHONE (OUTPATIENT)
Dept: FAMILY MEDICINE | Facility: CLINIC | Age: 55
End: 2020-01-09

## 2020-01-09 NOTE — TELEPHONE ENCOUNTER
Reviewed MRI of back with patient.  She is going to see spine specialist that her mom sees.  She will call me with the name. MRI put in for scanning

## 2020-01-29 ENCOUNTER — TELEPHONE (OUTPATIENT)
Dept: FAMILY MEDICINE | Facility: CLINIC | Age: 55
End: 2020-01-29

## 2020-01-29 NOTE — TELEPHONE ENCOUNTER
----- Message from Myrna Norwood, DO sent at 1/28/2020  8:29 PM EST -----  Let pt know that the mammogram was ok. Repeat in 1 year

## 2020-10-01 ENCOUNTER — TELEPHONE (OUTPATIENT)
Dept: FAMILY MEDICINE | Facility: CLINIC | Age: 55
End: 2020-10-01

## 2021-04-01 ENCOUNTER — APPOINTMENT (OUTPATIENT)
Dept: URBAN - METROPOLITAN AREA CLINIC 200 | Age: 56
Setting detail: DERMATOLOGY
End: 2021-04-05

## 2021-04-01 DIAGNOSIS — L57.8 OTHER SKIN CHANGES DUE TO CHRONIC EXPOSURE TO NONIONIZING RADIATION: ICD-10-CM

## 2021-04-01 DIAGNOSIS — D22 MELANOCYTIC NEVI: ICD-10-CM

## 2021-04-01 DIAGNOSIS — Z85.828 PERSONAL HISTORY OF OTHER MALIGNANT NEOPLASM OF SKIN: ICD-10-CM

## 2021-04-01 DIAGNOSIS — L73.8 OTHER SPECIFIED FOLLICULAR DISORDERS: ICD-10-CM

## 2021-04-01 DIAGNOSIS — Z85.820 PERSONAL HISTORY OF MALIGNANT MELANOMA OF SKIN: ICD-10-CM

## 2021-04-01 PROBLEM — D22.5 MELANOCYTIC NEVI OF TRUNK: Status: ACTIVE | Noted: 2021-04-01

## 2021-04-01 PROCEDURE — OTHER REASSURANCE: OTHER

## 2021-04-01 PROCEDURE — 99202 OFFICE O/P NEW SF 15 MIN: CPT

## 2021-04-01 PROCEDURE — OTHER COUNSELING: OTHER

## 2021-04-01 ASSESSMENT — LOCATION SIMPLE DESCRIPTION DERM
LOCATION SIMPLE: LEFT BREAST
LOCATION SIMPLE: CHEST
LOCATION SIMPLE: RIGHT EAR
LOCATION SIMPLE: ABDOMEN

## 2021-04-01 ASSESSMENT — LOCATION DETAILED DESCRIPTION DERM
LOCATION DETAILED: LEFT MEDIAL SUPERIOR CHEST
LOCATION DETAILED: RIGHT INFERIOR POSTERIOR HELIX
LOCATION DETAILED: LEFT AREOLA
LOCATION DETAILED: PERIUMBILICAL SKIN

## 2021-04-01 ASSESSMENT — LOCATION ZONE DERM
LOCATION ZONE: TRUNK
LOCATION ZONE: EAR

## 2022-04-12 ENCOUNTER — APPOINTMENT (OUTPATIENT)
Dept: URBAN - METROPOLITAN AREA CLINIC 200 | Age: 57
Setting detail: DERMATOLOGY
End: 2022-04-27

## 2022-04-12 DIAGNOSIS — D485 NEOPLASM OF UNCERTAIN BEHAVIOR OF SKIN: ICD-10-CM

## 2022-04-12 DIAGNOSIS — Z85.820 PERSONAL HISTORY OF MALIGNANT MELANOMA OF SKIN: ICD-10-CM

## 2022-04-12 DIAGNOSIS — Z11.52 ENCOUNTER FOR SCREENING FOR COVID-19: ICD-10-CM

## 2022-04-12 DIAGNOSIS — Z85.828 PERSONAL HISTORY OF OTHER MALIGNANT NEOPLASM OF SKIN: ICD-10-CM

## 2022-04-12 DIAGNOSIS — Z80.8 FAMILY HISTORY OF MALIGNANT NEOPLASM OF OTHER ORGANS OR SYSTEMS: ICD-10-CM

## 2022-04-12 DIAGNOSIS — L57.8 OTHER SKIN CHANGES DUE TO CHRONIC EXPOSURE TO NONIONIZING RADIATION: ICD-10-CM

## 2022-04-12 PROBLEM — D48.5 NEOPLASM OF UNCERTAIN BEHAVIOR OF SKIN: Status: ACTIVE | Noted: 2022-04-12

## 2022-04-12 PROCEDURE — OTHER SCREENING FOR COVID-19: OTHER

## 2022-04-12 PROCEDURE — OTHER COUNSELING: OTHER

## 2022-04-12 PROCEDURE — OTHER PHOTO-DOCUMENTATION: OTHER

## 2022-04-12 PROCEDURE — 99213 OFFICE O/P EST LOW 20 MIN: CPT

## 2022-04-12 PROCEDURE — OTHER SUNSCREEN RECOMMENDATIONS: OTHER

## 2022-04-12 ASSESSMENT — LOCATION SIMPLE DESCRIPTION DERM
LOCATION SIMPLE: LEFT BREAST
LOCATION SIMPLE: CHEST
LOCATION SIMPLE: ABDOMEN
LOCATION SIMPLE: LEFT ANTERIOR NECK

## 2022-04-12 ASSESSMENT — LOCATION ZONE DERM
LOCATION ZONE: NECK
LOCATION ZONE: TRUNK

## 2022-04-12 ASSESSMENT — LOCATION DETAILED DESCRIPTION DERM
LOCATION DETAILED: LEFT INFERIOR ANTERIOR NECK
LOCATION DETAILED: LEFT LATERAL BREAST 5-6:00 REGION
LOCATION DETAILED: PERIUMBILICAL SKIN
LOCATION DETAILED: LEFT MEDIAL SUPERIOR CHEST

## 2022-04-12 ASSESSMENT — PAIN INTENSITY VAS: HOW INTENSE IS YOUR PAIN 0 BEING NO PAIN, 10 BEING THE MOST SEVERE PAIN POSSIBLE?: NO PAIN

## 2023-04-13 ENCOUNTER — APPOINTMENT (OUTPATIENT)
Dept: URBAN - METROPOLITAN AREA CLINIC 203 | Age: 58
Setting detail: DERMATOLOGY
End: 2023-04-13

## 2023-04-13 DIAGNOSIS — Z85.828 PERSONAL HISTORY OF OTHER MALIGNANT NEOPLASM OF SKIN: ICD-10-CM

## 2023-04-13 DIAGNOSIS — Z85.820 PERSONAL HISTORY OF MALIGNANT MELANOMA OF SKIN: ICD-10-CM

## 2023-04-13 DIAGNOSIS — L57.8 OTHER SKIN CHANGES DUE TO CHRONIC EXPOSURE TO NONIONIZING RADIATION: ICD-10-CM

## 2023-04-13 DIAGNOSIS — Z80.8 FAMILY HISTORY OF MALIGNANT NEOPLASM OF OTHER ORGANS OR SYSTEMS: ICD-10-CM

## 2023-04-13 DIAGNOSIS — B07.8 OTHER VIRAL WARTS: ICD-10-CM

## 2023-04-13 PROCEDURE — OTHER SUNSCREEN RECOMMENDATIONS: OTHER

## 2023-04-13 PROCEDURE — 99213 OFFICE O/P EST LOW 20 MIN: CPT

## 2023-04-13 PROCEDURE — OTHER REASSURANCE: OTHER

## 2023-04-13 PROCEDURE — OTHER COUNSELING: OTHER

## 2023-04-13 ASSESSMENT — LOCATION SIMPLE DESCRIPTION DERM
LOCATION SIMPLE: LEFT BREAST
LOCATION SIMPLE: ABDOMEN
LOCATION SIMPLE: NOSE

## 2023-04-13 ASSESSMENT — LOCATION ZONE DERM
LOCATION ZONE: NOSE
LOCATION ZONE: TRUNK

## 2023-04-13 ASSESSMENT — LOCATION DETAILED DESCRIPTION DERM
LOCATION DETAILED: PERIUMBILICAL SKIN
LOCATION DETAILED: LEFT MEDIAL BREAST 11-12:00 REGION
LOCATION DETAILED: NASAL DORSUM
LOCATION DETAILED: EPIGASTRIC SKIN
LOCATION DETAILED: LEFT MEDIAL BREAST 10-11:00 REGION

## 2023-04-13 NOTE — PROCEDURE: COUNSELING
Detail Level: Generalized
Detail Level: Detailed
Quality 137: Melanoma: Continuity Of Care - Recall System: Patient information entered into a recall system that includes: target date for the next exam specified AND a process to follow up with patients regarding missed or unscheduled appointments
Patient Specific Counseling (Will Not Stick From Patient To Patient): I recommended the following:\\nSunscreen

## 2024-04-15 ENCOUNTER — APPOINTMENT (OUTPATIENT)
Dept: URBAN - METROPOLITAN AREA CLINIC 203 | Age: 59
Setting detail: DERMATOLOGY
End: 2024-04-22

## 2024-04-15 DIAGNOSIS — L82.1 OTHER SEBORRHEIC KERATOSIS: ICD-10-CM

## 2024-04-15 DIAGNOSIS — Z85.828 PERSONAL HISTORY OF OTHER MALIGNANT NEOPLASM OF SKIN: ICD-10-CM

## 2024-04-15 DIAGNOSIS — L81.4 OTHER MELANIN HYPERPIGMENTATION: ICD-10-CM

## 2024-04-15 DIAGNOSIS — L57.8 OTHER SKIN CHANGES DUE TO CHRONIC EXPOSURE TO NONIONIZING RADIATION: ICD-10-CM

## 2024-04-15 DIAGNOSIS — B35.1 TINEA UNGUIUM: ICD-10-CM

## 2024-04-15 DIAGNOSIS — Z85.820 PERSONAL HISTORY OF MALIGNANT MELANOMA OF SKIN: ICD-10-CM

## 2024-04-15 DIAGNOSIS — D18.0 HEMANGIOMA: ICD-10-CM

## 2024-04-15 DIAGNOSIS — Z80.8 FAMILY HISTORY OF MALIGNANT NEOPLASM OF OTHER ORGANS OR SYSTEMS: ICD-10-CM

## 2024-04-15 PROBLEM — D18.01 HEMANGIOMA OF SKIN AND SUBCUTANEOUS TISSUE: Status: ACTIVE | Noted: 2024-04-15

## 2024-04-15 PROCEDURE — OTHER REASSURANCE: OTHER

## 2024-04-15 PROCEDURE — OTHER SUNSCREEN RECOMMENDATIONS: OTHER

## 2024-04-15 PROCEDURE — 99213 OFFICE O/P EST LOW 20 MIN: CPT

## 2024-04-15 PROCEDURE — OTHER COUNSELING: OTHER

## 2024-04-15 ASSESSMENT — LOCATION DETAILED DESCRIPTION DERM
LOCATION DETAILED: UPPER STERNUM
LOCATION DETAILED: LEFT DISTAL PLANTAR GREAT TOE
LOCATION DETAILED: EPIGASTRIC SKIN
LOCATION DETAILED: LEFT MEDIAL BREAST 11-12:00 REGION
LOCATION DETAILED: RIGHT GREAT TOENAIL
LOCATION DETAILED: LEFT INFERIOR CENTRAL MALAR CHEEK
LOCATION DETAILED: PERIUMBILICAL SKIN
LOCATION DETAILED: INFERIOR THORACIC SPINE
LOCATION DETAILED: LEFT MEDIAL BREAST 10-11:00 REGION

## 2024-04-15 ASSESSMENT — LOCATION SIMPLE DESCRIPTION DERM
LOCATION SIMPLE: LEFT GREAT TOE
LOCATION SIMPLE: LEFT CHEEK
LOCATION SIMPLE: RIGHT GREAT TOE
LOCATION SIMPLE: ABDOMEN
LOCATION SIMPLE: CHEST
LOCATION SIMPLE: UPPER BACK
LOCATION SIMPLE: LEFT BREAST

## 2024-04-15 ASSESSMENT — LOCATION ZONE DERM
LOCATION ZONE: TRUNK
LOCATION ZONE: TOE
LOCATION ZONE: FACE
LOCATION ZONE: TOENAIL

## 2024-08-23 NOTE — PROGRESS NOTES
"Subjective      Patient ID: Maureen Melo is a 53 y.o. female.    Feels rough and bumpy in mouth and left side.  This is  been going on for a few months. Occasionally sensitive and that area as well.  She does wear a mouthguard at night.  Also patient training for marathon so runs regularly.  No history of oral tobacco use.  She is not a smoker.  Normal oral hygiene.        Tobacco  Allergies  Meds  Problems  Med Hx  Soc Hx      Review of Systems   Constitutional: Negative for activity change, appetite change, chills, fatigue and fever.   HENT: Positive for mouth sores. Negative for sore throat and trouble swallowing.      Allergies   Allergen Reactions   • No Known Allergies      Current Outpatient Prescriptions   Medication Sig Dispense Refill   • ALPRAZolam (XANAX) 0.25 mg tablet 0.25 mg.     • nystatin (MYCOSTATIN) 100,000 unit/mL suspension Swish and spit 5 mL (500,000 Units total) 4 (four) times a day for 10 days. 200 mL 0     No current facility-administered medications for this visit.      Past Medical History:   Diagnosis Date   • Colon polyp 05/07/2018    tubular adenoma     Past Surgical History:   Procedure Laterality Date   • COLONOSCOPY       Social History   Substance Use Topics   • Smoking status: Never Smoker   • Smokeless tobacco: Never Used   • Alcohol use Not on file     No family history on file.    Objective   Vitals:    07/24/18 1358   BP: 110/80   Pulse: 64   Resp: 14   Temp: 36.6 °C (97.9 °F)   Weight: 67.9 kg (149 lb 9.6 oz)   Height: 1.575 m (5' 2\")    Body mass index is 27.36 kg/m².  Physical Exam   Constitutional: She appears well-developed and well-nourished. No distress.   HENT:   Head: Normocephalic and atraumatic.   Nose: Nose normal. No mucosal edema.   Normal dentition and tongue.  On left side of buccal mucosa is noted white papules scattered but somewhat clustered in that area.   Lymphadenopathy:     She has no cervical adenopathy.       Assessment/Plan   Problem List " Items Addressed This Visit     None      Visit Diagnoses     Thrush, oral    -  Primary    Relevant Medications    nystatin (MYCOSTATIN) 100,000 unit/mL suspension      Area of abnormality in the mouth which has the appearance of thrush although it is focally only on the left side.  Rx nystatin swish and spit.  Consider ENT or dental evaluation if persists.    Patient agrees and verbalizes understanding of medication and treatment plan discussed at today's visit.  Patient was instructed to call or follow-up if symptoms persist or worsen.    No notes on file    Simona Villanueva DO    This note was created with voice recognition software. Inadvertent dictation errors should be disregarded. Please contact my office for clarification.   Armani Parada(Attending)